# Patient Record
Sex: FEMALE | URBAN - METROPOLITAN AREA
[De-identification: names, ages, dates, MRNs, and addresses within clinical notes are randomized per-mention and may not be internally consistent; named-entity substitution may affect disease eponyms.]

---

## 2019-11-13 ENCOUNTER — IMPORTED ENCOUNTER (OUTPATIENT)
Dept: URBAN - METROPOLITAN AREA CLINIC 9 | Facility: CLINIC | Age: 49
End: 2019-11-13

## 2021-10-16 ASSESSMENT — VISUAL ACUITY
OS_SC: 20/60 + SN
OD_SC: 20/40 + SN

## 2021-10-16 ASSESSMENT — TONOMETRY: OS_IOP_MMHG: 18

## 2022-06-28 NOTE — PATIENT DISCUSSION
Posterior capsular opacity accounts for the patient's complaints and is interfering with activities of daily living. Discussed risks and benefits of YAG Laser Capsulotomy. Patient wishes to proceed. Schedule YAG Laser Capsulotomy in the right  eye with Dr Yazmin Garcia.

## 2022-06-29 RX ORDER — MECLIZINE HYDROCHLORIDE 25 MG/1
TABLET ORAL
COMMUNITY
Start: 2021-08-09

## 2022-06-29 RX ORDER — FLUTICASONE PROPIONATE 50 MCG
SPRAY, SUSPENSION (ML) NASAL
COMMUNITY

## 2022-06-29 RX ORDER — DILTIAZEM HYDROCHLORIDE 120 MG/1
1 CAPSULE, COATED, EXTENDED RELEASE ORAL
COMMUNITY

## 2022-06-29 RX ORDER — FERROUS SULFATE 137(45) MG
TABLET, EXTENDED RELEASE ORAL
COMMUNITY
Start: 2021-08-09

## 2022-07-26 NOTE — PATIENT DISCUSSION
Posterior capsular opacity accounts for the patient's complaints and is interfering with activities of daily living. Discussed risks and benefits of YAG Laser Capsulotomy. Patient wishes to proceed. Schedule YAG Laser Capsulotomy in the right  eye with Dr Geovanni Frye.